# Patient Record
(demographics unavailable — no encounter records)

---

## 2025-01-08 NOTE — ASSESSMENT
[FreeTextEntry1] :   All medical entries were at my, Dr. Henrique Sena, direction. I have reviewed the chart and agree that the record accurately reflects my personal performance of the history, physical exam, assessment, and plan.  Our office nurse practitioner was present for the duration of the office visit.

## 2025-01-08 NOTE — ADDENDUM
[FreeTextEntry1] : Case discussed and reviewed with Dr. Grajeda.  Hemangioma has been noted on prior imaging from [facility].  Pt has been following with him following the Oct 2023 imaging.   [place] for review.

## 2025-01-08 NOTE — CONSULT LETTER
[Dear  ___] : Dear ~CLEMENT, [Consult Letter:] : I had the pleasure of evaluating your patient, [unfilled]. [Please see my note below.] : Please see my note below. [Consult Closing:] : Thank you very much for allowing me to participate in the care of this patient.  If you have any questions, please do not hesitate to contact me. [Sincerely,] : Sincerely, [FreeTextEntry2] : Antelmo Hernandez MD [FreeTextEntry3] : Henrique Sena MD Surgical Oncology [facility] Office: [phone] Cell: [phone]  [DrChava  ___] : Dr. TORREZ [___] : [unfilled]

## 2025-01-08 NOTE — HISTORY OF PRESENT ILLNESS
[de-identified] : Ms. ADRIANA REDDING is a 58-year-old woman, referred by Dr. Antelmo Hernandez for consultation regarding a liver lesion, here for a follow-up visit.   Adriana reports a palpable abdominal mass present for ~4 years, starting in , it has not changed in size, denies any pain or tenderness. She also notes a soft tissue mass to her LUE that she believes is also unchanged. In 2024, she presented to Dr. Antelmo Hernandez with both of these issues, he in turn sent her for a CT A/P that again showed 2 liver masses previously seen 10/2023.  CT A/P (@ [facility]) 10/2023  - liver is enlarged with the right hepatic lobe measuring 19.8 cm in craniocaudal dimension, heterogenous attenuation of the liver - in the inferior right hepatic lobe there is a lobular, partly calcified, hypo enhancing lesion measuring 7 cm, incompletely characterized on this single-phase exam -> f/u w/ liver protocol MRI   LUE U/S 2024 (@ [facility]) - 5.5 x 1.5 cm echogenic subdermal subq mass at the site of concern in left posterosuperior shoulder, nonspecific on U/S and could reflect a lipoma -> further eval w/ contrast MRI recommended   CT A/P 2024 (@ [facility]) - redemonstration of a heterogenous mass with associated calcifications in the anterior segment of the right lobe inferiorly, measures up to 6.7 x 5.8 cm and appears slightly smaller than previous study - there is a stable subcapsular subtle hypoattenuating mass in the anterior segment of the right lobe of the liver measuring 9 mm - no new liver masses  - rectus diastasis w/ a small periumbilical fat-containing hernia   PMH: DM, HTN, HLD, ?hypothyroid  PSH:  B/L knee replacement (2016 & 2019) Meds:  metformin, amlodipine, atorvastatin and Lantus  ALL:  lisinopril (swelling), oxycodone (swelling) SH:  denies ETOH or tobacco use, lives w/ her mom & daughter, not currently working FH: denies any family hx of malignancy  GYN: Menarche 12. Menopause 48. . Age of first full-term pregnancy 28. Denies OCP/HRT  EGD & colonoscopy- 2023 and WNL ECOG 0  liver protocol CT A/P 2024 (@ [facility]) - a 5.9 cm right hepatic lobe heterogenous centrally hypoattenuating lesion demonstrating patchy peripheral enhancement in the delayed phase w/ curvilinear calcifications in the center - there is mild capsular nodularity of the right anterior hepatic lobe secondary to the mass - right hepatic lobe predominant segmental hyperenhancement, which demonstrates isoenhancement in the delayed phase c/w differential perfusion  * Imaging characteristics suggestive of sclerosing hemangioma, however, recommend further eval w/ an MRI abdomen in 3-6 months*  2024 - Adriana presents for an initial consultation after being referred by Dr. Hernandez. She denies any abdominal pain, does not the palpable abdominal mass but it is painless/nontender. Her appetite and weight are well maintained, she does note occasional constipation, but it is a longstanding issue. She is otherwise in her usual state of health.  We reviewed the liver protocol CT scan with radiology.  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  Adriana MRI and will undergo the follow-up with us in 1 month.  MR/MRCP 2024 - mild hepatomegaly - heterogenous exophytic segment 5 calcified liver mass, indeterminate, but slightly decreased from 10/2023 and could represent an unusual presentation of a benign mass such as sclerosed hemangioma, similar numerous coalescent nodules extend superiorly to the liver dome  2024 - Adriana returns for ongoing follow-up and to discuss recent imaging, she denies any new health issues since our last visit. Adriana will follow-up in 6 months after a repeat MRI.

## 2025-01-09 NOTE — ADDENDUM
[FreeTextEntry1] : Case discussed and reviewed with Dr. Grajeda.  Hemangioma has been noted on prior imaging from [facility].  Pt has been following with him following the Oct 2023 imaging.   [place]imaging for review.

## 2025-01-09 NOTE — CONSULT LETTER
[FreeTextEntry2] : Antelmo Hernandez MD [FreeTextEntry3] : Henrique Sena MD Surgical Oncology [facility] Office: [phone] Cell: [phone]

## 2025-01-09 NOTE — HISTORY OF PRESENT ILLNESS
[de-identified] : Ms. ADRIANA REDDING is a 58-year-old woman, referred by Dr. Antelmo Hernandez for consultation regarding a liver lesion, here for a follow-up visit.   Adriana reports a palpable abdominal mass present for ~4 years, starting in , it has not changed in size, denies any pain or tenderness. She also notes a soft tissue mass to her LUE that she believes is also unchanged. In 2024, she presented to Dr. Antelmo Hernandez with both of these issues, he in turn sent her for a CT A/P that again showed 2 liver masses previously seen 10/2023.  CT A/P (@ [facility]) 10/2023  - liver is enlarged with the right hepatic lobe measuring 19.8 cm in craniocaudal dimension, heterogenous attenuation of the liver - in the inferior right hepatic lobe there is a lobular, partly calcified, hypo enhancing lesion measuring 7 cm, incompletely characterized on this single-phase exam -> f/u w/ liver protocol MRI   LUE U/S 2024 (@ [facility]) - 5.5 x 1.5 cm echogenic subdermal subq mass at the site of concern in left posterosuperior shoulder, nonspecific on U/S and could reflect a lipoma -> further eval w/ contrast MRI recommended   CT A/P 2024 (@ [facility]) - redemonstration of a heterogenous mass with associated calcifications in the anterior segment of the right lobe inferiorly, measures up to 6.7 x 5.8 cm and appears slightly smaller than previous study - there is a stable subcapsular subtle hypoattenuating mass in the anterior segment of the right lobe of the liver measuring 9 mm - no new liver masses  - rectus diastasis w/ a small periumbilical fat-containing hernia   PMH: DM, HTN, HLD, ?hypothyroid  PSH:  B/L knee replacement (2016 & 2019) Meds:  metformin, amlodipine, atorvastatin and Lantus  ALL:  lisinopril (swelling), oxycodone (swelling) SH:  denies ETOH or tobacco use, lives w/ her mom & daughter, not currently working FH: denies any family hx of malignancy  GYN: Menarche 12. Menopause 48. . Age of first full-term pregnancy 28. Denies OCP/HRT  EGD & colonoscopy- 2023 and WNL ECOG 0  liver protocol CT A/P 2024 (@ [facility]) - a 5.9 cm right hepatic lobe heterogenous centrally hypoattenuating lesion demonstrating patchy peripheral enhancement in the delayed phase w/ curvilinear calcifications in the center - there is mild capsular nodularity of the right anterior hepatic lobe secondary to the mass - right hepatic lobe predominant segmental hyperenhancement, which demonstrates isoenhancement in the delayed phase c/w differential perfusion  * Imaging characteristics suggestive of sclerosing hemangioma, however, recommend further eval w/ an MRI abdomen in 3-6 months*  2024 - Adriana presents for an initial consultation after being referred by Dr. Hernandez. She denies any abdominal pain, does not the palpable abdominal mass but it is painless/nontender. Her appetite and weight are well maintained, she does note occasional constipation, but it is a longstanding issue. She is otherwise in her usual state of health.  We reviewed the liver protocol CT scan with radiology.  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  Adriana MRI and will undergo the follow-up with us in 1 month.  MR/MRCP 2024 - mild hepatomegaly - heterogenous exophytic segment 5 calcified liver mass, indeterminate, but slightly decreased from 10/2023 and could represent an unusual presentation of a benign mass such as sclerosed hemangioma, similar numerous coalescent nodules extend superiorly to the liver dome  2024 - Adriana returns for ongoing follow-up and to discuss recent imaging, she denies any new health issues since our last visit. Adriana will follow-up in 6 months after a repeat MRI.

## 2025-03-06 NOTE — ADDENDUM
[FreeTextEntry1] : Case discussed and reviewed with Dr. Grajeda.  Hemangioma has been noted on prior imaging from [facility].  Pt has been following with him following the Oct 2023 imaging.   [place] reports[place]imaging for review.

## 2025-03-06 NOTE — HISTORY OF PRESENT ILLNESS
[de-identified] : Ms. ADRIANA REDDING is a 58-year-old woman, referred by Dr. Antelmo Hernandez for consultation regarding a liver lesion, here for a follow-up visit.   Adriana reports a palpable abdominal mass present for ~4 years, starting in , it has not changed in size, denies any pain or tenderness. She also notes a soft tissue mass to her LUE that she believes is also unchanged. In 2024, she presented to Dr. Antelmo Hernandez with both of these issues, he in turn sent her for a CT A/P that again showed 2 liver masses previously seen 10/2023.  CT A/P (@ [facility]) 10/2023  - liver is enlarged with the right hepatic lobe measuring 19.8 cm in craniocaudal dimension, heterogenous attenuation of the liver - in the inferior right hepatic lobe there is a lobular, partly calcified, hypo enhancing lesion measuring 7 cm, incompletely characterized on this single-phase exam -> f/u w/ liver protocol MRI   LUE U/S 2024 (@ [facility]) - 5.5 x 1.5 cm echogenic subdermal subq mass at the site of concern in left posterosuperior shoulder, nonspecific on U/S and could reflect a lipoma -> further eval w/ contrast MRI recommended   CT A/P 2024 (@ [facility]) - redemonstration of a heterogenous mass with associated calcifications in the anterior segment of the right lobe inferiorly, measures up to 6.7 x 5.8 cm and appears slightly smaller than previous study - there is a stable subcapsular subtle hypoattenuating mass in the anterior segment of the right lobe of the liver measuring 9 mm - no new liver masses  - rectus diastasis w/ a small periumbilical fat-containing hernia   PMH: DM, HTN, HLD, ?hypothyroid  PSH:  B/L knee replacement (2016 & 2019) Meds:  metformin, amlodipine, atorvastatin and Lantus  ALL:  lisinopril (swelling), oxycodone (swelling) SH:  denies ETOH or tobacco use, lives w/ her mom & daughter, not currently working FH: denies any family hx of malignancy  GYN: Menarche 12. Menopause 48. . Age of first full-term pregnancy 28. Denies OCP/HRT  EGD & colonoscopy- 2023 and WNL ECOG 0  liver protocol CT A/P 2024 (@ [facility]) - a 5.9 cm right hepatic lobe heterogenous centrally hypoattenuating lesion demonstrating patchy peripheral enhancement in the delayed phase w/ curvilinear calcifications in the center - there is mild capsular nodularity of the right anterior hepatic lobe secondary to the mass - right hepatic lobe predominant segmental hyperenhancement, which demonstrates isoenhancement in the delayed phase c/w differential perfusion  * Imaging characteristics suggestive of sclerosing hemangioma, however, recommend further eval w/ an MRI abdomen in 3-6 months*  2024 - Adriana presents for an initial consultation after being referred by Dr. Hernandez. She denies any abdominal pain, does note the palpable abdominal mass but it is painless/nontender. Her appetite and weight are well maintained, she does note occasional constipation, but it is a longstanding issue. She is otherwise in her usual state of health.  We reviewed the liver protocol CT scan with radiology.  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  Adriana MRI and will undergo the follow-up with us in 1 month.  MR/MRCP 2024 - mild hepatomegaly - heterogenous exophytic segment 5 calcified liver mass, indeterminate, but slightly decreased from 10/2023 and could represent an unusual presentation of a benign mass such as sclerosed hemangioma, similar numerous coalescent nodules extend superiorly to the liver dome  2024 - Adriana returns for ongoing follow-up and to discuss recent imaging, she denies any new health issues since our last visit. Adriana will follow-up in 6 months after a repeat MRI.   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 - Indeterminate 1.3 cm left hepatic lobe lesion is stable from 2024. -  Mildly enlarged bilateral axillary lymph nodes are partially imaged. Nonspecific bone marrow heterogeneity is increased from prior exam -> Consider workup for lymphoproliferative disorder  3/6/2025 - Rosalina returns for ongoing follow-up, she denies any new or worrisome health issues. She feels well overall; appetite & weight are maintained. She denies any B symptoms.

## 2025-03-06 NOTE — HISTORY OF PRESENT ILLNESS
[de-identified] : Ms. ADRIANA REDDING is a 58-year-old woman, referred by Dr. Antelmo Hernandez for consultation regarding a liver lesion, here for a follow-up visit.   Adriana reports a palpable abdominal mass present for ~4 years, starting in , it has not changed in size, denies any pain or tenderness. She also notes a soft tissue mass to her LUE that she believes is also unchanged. In 2024, she presented to Dr. Antelmo Hernandez with both of these issues, he in turn sent her for a CT A/P that again showed 2 liver masses previously seen 10/2023.  CT A/P (@ [facility]) 10/2023  - liver is enlarged with the right hepatic lobe measuring 19.8 cm in craniocaudal dimension, heterogenous attenuation of the liver - in the inferior right hepatic lobe there is a lobular, partly calcified, hypo enhancing lesion measuring 7 cm, incompletely characterized on this single-phase exam -> f/u w/ liver protocol MRI   LUE U/S 2024 (@ [facility]) - 5.5 x 1.5 cm echogenic subdermal subq mass at the site of concern in left posterosuperior shoulder, nonspecific on U/S and could reflect a lipoma -> further eval w/ contrast MRI recommended   CT A/P 2024 (@ [facility]) - redemonstration of a heterogenous mass with associated calcifications in the anterior segment of the right lobe inferiorly, measures up to 6.7 x 5.8 cm and appears slightly smaller than previous study - there is a stable subcapsular subtle hypoattenuating mass in the anterior segment of the right lobe of the liver measuring 9 mm - no new liver masses  - rectus diastasis w/ a small periumbilical fat-containing hernia   PMH: DM, HTN, HLD, ?hypothyroid  PSH:  B/L knee replacement (2016 & 2019) Meds:  metformin, amlodipine, atorvastatin and Lantus  ALL:  lisinopril (swelling), oxycodone (swelling) SH:  denies ETOH or tobacco use, lives w/ her mom & daughter, not currently working FH: denies any family hx of malignancy  GYN: Menarche 12. Menopause 48. . Age of first full-term pregnancy 28. Denies OCP/HRT  EGD & colonoscopy- 2023 and WNL ECOG 0  liver protocol CT A/P 2024 (@ [facility]) - a 5.9 cm right hepatic lobe heterogenous centrally hypoattenuating lesion demonstrating patchy peripheral enhancement in the delayed phase w/ curvilinear calcifications in the center - there is mild capsular nodularity of the right anterior hepatic lobe secondary to the mass - right hepatic lobe predominant segmental hyperenhancement, which demonstrates isoenhancement in the delayed phase c/w differential perfusion  * Imaging characteristics suggestive of sclerosing hemangioma, however, recommend further eval w/ an MRI abdomen in 3-6 months*  2024 - Adriana presents for an initial consultation after being referred by Dr. Hernandez. She denies any abdominal pain, does note the palpable abdominal mass but it is painless/nontender. Her appetite and weight are well maintained, she does note occasional constipation, but it is a longstanding issue. She is otherwise in her usual state of health.  We reviewed the liver protocol CT scan with radiology.  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  Adriana MRI and will undergo the follow-up with us in 1 month.  MR/MRCP 2024 - mild hepatomegaly - heterogenous exophytic segment 5 calcified liver mass, indeterminate, but slightly decreased from 10/2023 and could represent an unusual presentation of a benign mass such as sclerosed hemangioma, similar numerous coalescent nodules extend superiorly to the liver dome  2024 - Adriana returns for ongoing follow-up and to discuss recent imaging, she denies any new health issues since our last visit. Adriana will follow-up in 6 months after a repeat MRI.   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 - Indeterminate 1.3 cm left hepatic lobe lesion is stable from 2024. -  Mildly enlarged bilateral axillary lymph nodes are partially imaged. Nonspecific bone marrow heterogeneity is increased from prior exam -> Consider workup for lymphoproliferative disorder  3/6/2025 - Rosalina returns for ongoing follow-up, she denies any new or worrisome health issues. She feels well overall; appetite & weight are maintained. She denies any B symptoms.

## 2025-03-06 NOTE — ASSESSMENT
[FreeTextEntry1] : We discussed Nati's recent imaging.  She will undergo a mammogram and sonogram and PET/CT in the near future.  She will follow-up with us in approximate 1 month.  All medical entries were at my, Dr. Henrique Sena, direction. I have reviewed the chart and agree that the record accurately reflects my personal performance of the history, physical exam, assessment, and plan.  Our office nurse practitioner was present for the duration of the office visit.

## 2025-03-06 NOTE — ADDENDUM
[FreeTextEntry1] : Case discussed and reviewed with Dr. Grajeda.  Hemangioma has been noted on prior imaging from [facility].  Pt has been following with him following the Oct 2023 imaging.   [place] reports[place]imaging for review.

## 2025-03-06 NOTE — CONSULT LETTER
[FreeTextEntry2] : Antelmo Hernandez MD [FreeTextEntry3] : Henrique Sena MD Surgical Oncology [facility] Office: [phone] Cell: [phone]

## 2025-03-06 NOTE — CONSULT LETTER
[FreeTextEntry2] : Antelmo Hernandez MD [FreeTextEntry3] : Henrique Sena MD Surgical Oncology [facility] Office: [phone] Cell: [phone]

## 2025-04-30 NOTE — ADDENDUM
[FreeTextEntry1] : Case discussed and reviewed with Dr. Grajeda.  Hemangioma has been noted on prior imaging from [facility].  Pt has been following with him following the Oct 2023 imaging.   [place] reports[place]imaging for review.

## 2025-04-30 NOTE — HISTORY OF PRESENT ILLNESS
[de-identified] : Ms. ADRIANA REDDING is a 59-year-old woman, referred by Dr. Antelmo Hernandez for consultation regarding a liver lesion, here for a follow-up visit.   Adriana reports a palpable abdominal mass present for ~4 years, starting in , it has not changed in size, denies any pain or tenderness. She also notes a soft tissue mass to her LUE that she believes is also unchanged. In 2024, she presented to Dr. Antelmo Hernandez with both of these issues, he in turn sent her for a CT A/P that again showed 2 liver masses previously seen 10/2023.  CT A/P (@ [facility]) 10/2023  - liver is enlarged with the right hepatic lobe measuring 19.8 cm in craniocaudal dimension, heterogenous attenuation of the liver - in the inferior right hepatic lobe there is a lobular, partly calcified, hypo enhancing lesion measuring 7 cm, incompletely characterized on this single-phase exam -> f/u w/ liver protocol MRI   LUE U/S 2024 (@ [facility]) - 5.5 x 1.5 cm echogenic subdermal subq mass at the site of concern in left posterosuperior shoulder, nonspecific on U/S and could reflect a lipoma -> further eval w/ contrast MRI recommended   CT A/P 2024 (@ [facility]) - redemonstration of a heterogenous mass with associated calcifications in the anterior segment of the right lobe inferiorly, measures up to 6.7 x 5.8 cm and appears slightly smaller than previous study - there is a stable subcapsular subtle hypoattenuating mass in the anterior segment of the right lobe of the liver measuring 9 mm - no new liver masses  - rectus diastasis w/ a small periumbilical fat-containing hernia   PMH: DM, HTN, HLD, ?hypothyroid  PSH:  B/L knee replacement (2016 & 2019) Meds:  metformin, amlodipine, atorvastatin and Lantus  ALL:  lisinopril (swelling), oxycodone (swelling) SH:  denies ETOH or tobacco use, lives w/ her mom & daughter, not currently working FH: denies any family hx of malignancy  GYN: Menarche 12. Menopause 48. . Age of first full-term pregnancy 28. Denies OCP/HRT  EGD & colonoscopy- 2023 and WNL ECOG 0  liver protocol CT A/P 2024 (@ [facility]) - a 5.9 cm right hepatic lobe heterogenous centrally hypoattenuating lesion demonstrating patchy peripheral enhancement in the delayed phase w/ curvilinear calcifications in the center - there is mild capsular nodularity of the right anterior hepatic lobe secondary to the mass - right hepatic lobe predominant segmental hyperenhancement, which demonstrates isoenhancement in the delayed phase c/w differential perfusion  * Imaging characteristics suggestive of sclerosing hemangioma, however, recommend further eval w/ an MRI abdomen in 3-6 months*  2024 - Adriana presents for an initial consultation after being referred by Dr. Hernandez. She denies any abdominal pain, does note the palpable abdominal mass but it is painless/nontender. Her appetite and weight are well maintained, she does note occasional constipation, but it is a longstanding issue. She is otherwise in her usual state of health.  We reviewed the liver protocol CT scan with radiology.  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  Adriana MRI and will undergo the follow-up with us in 1 month.  MR/MRCP 2024 - mild hepatomegaly - heterogenous exophytic segment 5 calcified liver mass, indeterminate, but slightly decreased from 10/2023 and could represent an unusual presentation of a benign mass such as sclerosed hemangioma, similar numerous coalescent nodules extend superiorly to the liver dome  2024 - Adriana returns for ongoing follow-up and to discuss recent imaging, she denies any new health issues since our last visit. Adriana will follow-up in 6 months after a repeat MRI.   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 - Indeterminate 1.3 cm left hepatic lobe lesion is stable from 2024. -  Mildly enlarged bilateral axillary lymph nodes are partially imaged. Nonspecific bone marrow heterogeneity is increased from prior exam -> Consider workup for lymphoproliferative disorder  3/6/2025 - Rosalina returns for ongoing follow-up, she denies any new or worrisome health issues. She feels well overall; appetite & weight are maintained. She denies any B symptoms. We discussed Adriana's recent imaging.  She will undergo a mammogram and sonogram and PET/CT in the near future.  She will follow-up with us in approximate 1 month.  PET/CT 3/2025 - hypermetabolic prominent to enlarged LN both above & below the diaphragm, the most intensely FDG avid include B/L upper cervical stations, B/L axillary nodes & B/L inguinal nodes. Additional hypermetabolic nodes involve the B/L pelvic debo station -> differentials include lymphoma & lymphoproliferative disorder - intense hypermetabolism is visualized within the B/L parotid glands, which may be secondary benign or malignant etiology such as Liam gland tumor or pleomorphic adenoma -> consider f/u US & US bx - normal sized spleen however w/ mildly increased diffuse hypermetabolism relative to liver activtiy - noonavid fluid density ovoid structure within the posterior RIGHT breast may be r/t cyst - however correlate w/ mammo/sono - no hypermetabolism to a calcified inferior right hepatic lobe mass described on MRI from 2025 - enlarged left thyroid gland containing inferior coarsely calcified low-density structure suggestive for nodule - correlate w/ thyroid US - intense hypermetabolism within the cecum, most of the uptake is associated w/ artifact from pooling of oral contrast - however this may limit sensitivity for any potential small lesions, clinical factors will determine the need for Cscope as warranted

## 2025-04-30 NOTE — CONSULT LETTER
[Dear  ___] : Dear ~CLEMENT, [Consult Letter:] : I had the pleasure of evaluating your patient, [unfilled]. [Please see my note below.] : Please see my note below. [Consult Closing:] : Thank you very much for allowing me to participate in the care of this patient.  If you have any questions, please do not hesitate to contact me. [Sincerely,] : Sincerely, [FreeTextEntry2] : Antelmo Hernandez MD [FreeTextEntry3] : Henrique Sena MD Surgical Oncology [facility] Office: [phone] Cell: [phone]  [DrChava  ___] : Dr. TORREZ [___] : [unfilled]

## 2025-04-30 NOTE — PHYSICAL EXAM
[Normal] : normal breast inspection and palpation of axillas [Normal Axillary Lymph Nodes] : normal axillary lymph nodes [Normal] : oriented to person, place and time, with appropriate affect

## 2025-05-01 NOTE — CONSULT LETTER
[FreeTextEntry2] : Antelmo Hernandez MD [FreeTextEntry3] : Henrique Sena MD Surgical Oncology [facility] Office: [phone] Cell: [phone]

## 2025-05-01 NOTE — ADDENDUM
[FreeTextEntry1] : Case discussed and reviewed with Dr. Grajeda.  Hemangioma has been noted on prior imaging from [facility].  Pt has been following with him following the Oct 2023 imaging.   [place] reports[place]imaging for review.

## 2025-05-29 NOTE — HISTORY OF PRESENT ILLNESS
[de-identified] : Ms. ADRIANA REDDING is a 59-year-old woman, referred by Dr. Antelmo Hernandez for consultation regarding a liver lesion, here for a follow-up visit.   Adriana reports a palpable abdominal mass present for ~4 years, starting in , it has not changed in size, denies any pain or tenderness. She also notes a soft tissue mass to her LUE that she believes is also unchanged. In 2024, she presented to Dr. Antelmo Hernandez with both of these issues, he in turn sent her for a CT A/P that again showed 2 liver masses previously seen 10/2023.  CT A/P (@ [facility]) 10/2023  - liver is enlarged with the right hepatic lobe measuring 19.8 cm in craniocaudal dimension, heterogenous attenuation of the liver - in the inferior right hepatic lobe there is a lobular, partly calcified, hypo enhancing lesion measuring 7 cm, incompletely characterized on this single-phase exam -> f/u w/ liver protocol MRI   LUE U/S 2024 (@ [facility]) - 5.5 x 1.5 cm echogenic subdermal subq mass at the site of concern in left posterosuperior shoulder, nonspecific on U/S and could reflect a lipoma -> further eval w/ contrast MRI recommended   CT A/P 2024 (@ [facility]) - redemonstration of a heterogenous mass with associated calcifications in the anterior segment of the right lobe inferiorly, measures up to 6.7 x 5.8 cm and appears slightly smaller than previous study - there is a stable subcapsular subtle hypoattenuating mass in the anterior segment of the right lobe of the liver measuring 9 mm - no new liver masses  - rectus diastasis w/ a small periumbilical fat-containing hernia   PMH: DM, HTN, HLD, ?hypothyroid  PSH:  B/L knee replacement (2016 & 2019) Meds:  metformin, amlodipine, atorvastatin and Lantus  ALL:  lisinopril (swelling), oxycodone (swelling) SH:  denies ETOH or tobacco use, lives w/ her mom & daughter, not currently working FH: denies any family hx of malignancy  GYN: Menarche 12. Menopause 48. . Age of first full-term pregnancy 28. Denies OCP/HRT  EGD & colonoscopy- 2023 and WNL (Dr. Jerry Grajeda) ECOG 0  liver protocol CT A/P 2024 (@ [facility]) - a 5.9 cm right hepatic lobe heterogenous centrally hypoattenuating lesion demonstrating patchy peripheral enhancement in the delayed phase w/ curvilinear calcifications in the center - there is mild capsular nodularity of the right anterior hepatic lobe secondary to the mass - right hepatic lobe predominant segmental hyperenhancement, which demonstrates isoenhancement in the delayed phase c/w differential perfusion  * Imaging characteristics suggestive of sclerosing hemangioma, however, recommend further eval w/ an MRI abdomen in 3-6 months*  2024 - Adriana presents for an initial consultation after being referred by Dr. Hernandez. She denies any abdominal pain, does note the palpable abdominal mass but it is painless/nontender. Her appetite and weight are well maintained, she does note occasional constipation, but it is a longstanding issue. She is otherwise in her usual state of health.  We reviewed the liver protocol CT scan with radiology.  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  Adriana MRI and will undergo the follow-up with us in 1 month.  MR/MRCP 2024 - mild hepatomegaly - heterogenous exophytic segment 5 calcified liver mass, indeterminate, but slightly decreased from 10/2023 and could represent an unusual presentation of a benign mass such as sclerosed hemangioma, similar numerous coalescent nodules extend superiorly to the liver dome  2024 - Adriana returns for ongoing follow-up and to discuss recent imaging, she denies any new health issues since our last visit. Adriana will follow-up in 6 months after a repeat MRI.   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 - Indeterminate 1.3 cm left hepatic lobe lesion is stable from 2024. -  Mildly enlarged bilateral axillary lymph nodes are partially imaged. Nonspecific bone marrow heterogeneity is increased from prior exam -> Consider workup for lymphoproliferative disorder  3/6/2025 - Rosalina returns for ongoing follow-up, she denies any new or worrisome health issues. She feels well overall; appetite & weight are maintained. She denies any B symptoms. We discussed Adriana's recent imaging.  She will undergo a mammogram and sonogram and PET/CT in the near future.  She will follow-up with us in approximate 1 month.  PET/CT 3/2025 - hypermetabolic prominent to enlarged LN both above & below the diaphragm, the most intensely FDG avid include B/L upper cervical stations, B/L axillary nodes & B/L inguinal nodes. Additional hypermetabolic nodes involve the B/L pelvic debo station -> differentials include lymphoma & lymphoproliferative disorder - intense hypermetabolism is visualized within the B/L parotid glands, which may be secondary benign or malignant etiology such as [place] gland tumor or pleomorphic adenoma -> consider f/u US & US bx - normal sized spleen however w/ mildly increased diffuse hypermetabolism relative to liver activtiy - noonavid fluid density ovoid structure within the posterior RIGHT breast may be r/t cyst - however correlate w/ mammo/sono - no hypermetabolism to a calcified inferior right hepatic lobe mass described on MRI from 2025 - enlarged left thyroid gland containing inferior coarsely calcified low-density structure suggestive for nodule - correlate w/ thyroid US - intense hypermetabolism within the cecum, most of the uptake is associated w/ artifact from pooling of oral contrast - however this may limit sensitivity for any potential small lesions, clinical factors will determine the need for Cscope as warranted  B/L mammo/sono 2025 - multiple B/L abnormal appearing LN in both axilla on mammo/sono -> f/u US biopsy of Left axilla (measuring 6.5 mm) - superficial circumscribed ovoid mildly hypoechoic nodule to R 4:00 N14, 2.1 cm, not dermal (likely corresponding to fluid density seen on PET/CT) -> f/u R US biopsy  - small nodule to R UOP (w/o sono correlate) -> f/u w/ R mammo in 6 months BI-RADS 4B  US biopsies 2025 - 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 She denies any B symptoms, feeling well overall, appetite & weight are well maintained, no fevers/chills or night sweats. She has some residual tenderness from the recent breast & axilla biopsies that are resolving.

## 2025-05-29 NOTE — ADDENDUM
[FreeTextEntry1] : Case discussed and reviewed with Dr. Grajeda.  Hemangioma has been noted on prior imaging from [facility].  Pt has been following with him following the Oct 2023 imaging.   Will obtain [facility] reports/imaging for review.

## 2025-05-29 NOTE — HISTORY OF PRESENT ILLNESS
[de-identified] : Ms. ADRIANA REDDING is a 59-year-old woman, referred by Dr. Antelmo Hernandez for consultation regarding a liver lesion, here for a follow-up visit.   Adriana reports a palpable abdominal mass present for ~4 years, starting in , it has not changed in size, denies any pain or tenderness. She also notes a soft tissue mass to her LUE that she believes is also unchanged. In 2024, she presented to Dr. Antelmo Hernandez with both of these issues, he in turn sent her for a CT A/P that again showed 2 liver masses previously seen 10/2023.  CT A/P (@ [facility]) 10/2023  - liver is enlarged with the right hepatic lobe measuring 19.8 cm in craniocaudal dimension, heterogenous attenuation of the liver - in the inferior right hepatic lobe there is a lobular, partly calcified, hypo enhancing lesion measuring 7 cm, incompletely characterized on this single-phase exam -> f/u w/ liver protocol MRI   LUE U/S 2024 (@ [facility]) - 5.5 x 1.5 cm echogenic subdermal subq mass at the site of concern in left posterosuperior shoulder, nonspecific on U/S and could reflect a lipoma -> further eval w/ contrast MRI recommended   CT A/P 2024 (@ [facility]) - redemonstration of a heterogenous mass with associated calcifications in the anterior segment of the right lobe inferiorly, measures up to 6.7 x 5.8 cm and appears slightly smaller than previous study - there is a stable subcapsular subtle hypoattenuating mass in the anterior segment of the right lobe of the liver measuring 9 mm - no new liver masses  - rectus diastasis w/ a small periumbilical fat-containing hernia   PMH: DM, HTN, HLD, ?hypothyroid  PSH:  B/L knee replacement (2016 & 2019) Meds:  metformin, amlodipine, atorvastatin and Lantus  ALL:  lisinopril (swelling), oxycodone (swelling) SH:  denies ETOH or tobacco use, lives w/ her mom & daughter, not currently working FH: denies any family hx of malignancy  GYN: Menarche 12. Menopause 48. . Age of first full-term pregnancy 28. Denies OCP/HRT  EGD & colonoscopy- 2023 and WNL (Dr. Jerry Grajeda) ECOG 0  liver protocol CT A/P 2024 (@ [facility]) - a 5.9 cm right hepatic lobe heterogenous centrally hypoattenuating lesion demonstrating patchy peripheral enhancement in the delayed phase w/ curvilinear calcifications in the center - there is mild capsular nodularity of the right anterior hepatic lobe secondary to the mass - right hepatic lobe predominant segmental hyperenhancement, which demonstrates isoenhancement in the delayed phase c/w differential perfusion  * Imaging characteristics suggestive of sclerosing hemangioma, however, recommend further eval w/ an MRI abdomen in 3-6 months*  2024 - Adriana presents for an initial consultation after being referred by Dr. Hernandez. She denies any abdominal pain, does note the palpable abdominal mass but it is painless/nontender. Her appetite and weight are well maintained, she does note occasional constipation, but it is a longstanding issue. She is otherwise in her usual state of health.  We reviewed the liver protocol CT scan with radiology.  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  Adriana MRI and will undergo the follow-up with us in 1 month.  MR/MRCP 2024 - mild hepatomegaly - heterogenous exophytic segment 5 calcified liver mass, indeterminate, but slightly decreased from 10/2023 and could represent an unusual presentation of a benign mass such as sclerosed hemangioma, similar numerous coalescent nodules extend superiorly to the liver dome  2024 - Adriana returns for ongoing follow-up and to discuss recent imaging, she denies any new health issues since our last visit. Adriana will follow-up in 6 months after a repeat MRI.   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 - Indeterminate 1.3 cm left hepatic lobe lesion is stable from 2024. -  Mildly enlarged bilateral axillary lymph nodes are partially imaged. Nonspecific bone marrow heterogeneity is increased from prior exam -> Consider workup for lymphoproliferative disorder  3/6/2025 - Rosalina returns for ongoing follow-up, she denies any new or worrisome health issues. She feels well overall; appetite & weight are maintained. She denies any B symptoms. We discussed Adriana's recent imaging.  She will undergo a mammogram and sonogram and PET/CT in the near future.  She will follow-up with us in approximate 1 month.  PET/CT 3/2025 - hypermetabolic prominent to enlarged LN both above & below the diaphragm, the most intensely FDG avid include B/L upper cervical stations, B/L axillary nodes & B/L inguinal nodes. Additional hypermetabolic nodes involve the B/L pelvic debo station -> differentials include lymphoma & lymphoproliferative disorder - intense hypermetabolism is visualized within the B/L parotid glands, which may be secondary benign or malignant etiology such as [place] gland tumor or pleomorphic adenoma -> consider f/u US & US bx - normal sized spleen however w/ mildly increased diffuse hypermetabolism relative to liver activtiy - noonavid fluid density ovoid structure within the posterior RIGHT breast may be r/t cyst - however correlate w/ mammo/sono - no hypermetabolism to a calcified inferior right hepatic lobe mass described on MRI from 2025 - enlarged left thyroid gland containing inferior coarsely calcified low-density structure suggestive for nodule - correlate w/ thyroid US - intense hypermetabolism within the cecum, most of the uptake is associated w/ artifact from pooling of oral contrast - however this may limit sensitivity for any potential small lesions, clinical factors will determine the need for Cscope as warranted  B/L mammo/sono 2025 - multiple B/L abnormal appearing LN in both axilla on mammo/sono -> f/u US biopsy of Left axilla (measuring 6.5 mm) - superficial circumscribed ovoid mildly hypoechoic nodule to R 4:00 N14, 2.1 cm, not dermal (likely corresponding to fluid density seen on PET/CT) -> f/u R US biopsy  - small nodule to R UOP (w/o sono correlate) -> f/u w/ R mammo in 6 months BI-RADS 4B  US biopsies 2025 - 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 She denies any B symptoms, feeling well overall, appetite & weight are well maintained, no fevers/chills or night sweats. She has some residual tenderness from the recent breast & axilla biopsies that are resolving.

## 2025-05-29 NOTE — CONSULT LETTER
[FreeTextEntry2] : Antelmo Hernandez MD [FreeTextEntry3] : Henrique Sena MD Surgical Oncology [facility] Office: [phone] Cell: [phone]

## 2025-05-29 NOTE — ASSESSMENT
[FreeTextEntry1] :  We reviewed Nati's recent imaging and biopsy results and provided her with copies of the reports.  She will undergo a neck ultrasound and biopsy of the parotid and cervical LNs.  She will continue to follow with her Endocrinologist (Dr. Ashlee Brown) for the thyroid nodules.  She will also touch base with her GI (Dr. Jerry Grajeda) regarding her most recent PET CT findings.    She will follow up next month.  All medical entries were at my, Dr. Henrique Sena, direction. I have reviewed the chart and agree that the record accurately reflects my personal performance of the history, physical exam, assessment, and plan.  Our office nurse practitioner was present for the duration of the office visit.

## 2025-05-29 NOTE — ADDENDUM
[FreeTextEntry1] : Case discussed and reviewed with Dr. Grajeda.  Hemangioma has been noted on prior imaging from [facility].  Pt has been following with him following the Oct 2023 imaging.   Will obtain [facility] reports/imaging for review.

## 2025-05-29 NOTE — CONSULT LETTER
[FreeTextEntry2] : Antelmo Hernandez MD [FreeTextEntry3] : Henrique Sena MD Surgical Oncology [facility] Office: [phone] Cell: [phone]

## 2025-06-26 NOTE — HISTORY OF PRESENT ILLNESS
[de-identified] : Ms. ADRIANA ERDDING is a 59-year-old woman, referred by Dr. Antelmo Hernandez for consultation regarding a liver lesion, here for a follow-up visit.   Adriana reports a palpable abdominal mass present for ~4 years, starting in , it has not changed in size, denies any pain or tenderness. She also notes a soft tissue mass to her LUE that she believes is also unchanged. In 2024, she presented to Dr. Antelmo Hernandez with both of these issues, he in turn sent her for a CT A/P that again showed 2 liver masses previously seen 10/2023.  CT A/P (@ [facility]) 10/2023  - liver is enlarged with the right hepatic lobe measuring 19.8 cm in craniocaudal dimension, heterogenous attenuation of the liver - in the inferior right hepatic lobe there is a lobular, partly calcified, hypo enhancing lesion measuring 7 cm, incompletely characterized on this single-phase exam -> f/u w/ liver protocol MRI   LUE U/S 2024 (@ [facility]) - 5.5 x 1.5 cm echogenic subdermal subq mass at the site of concern in left posterosuperior shoulder, nonspecific on U/S and could reflect a lipoma -> further eval w/ contrast MRI recommended   CT A/P 2024 (@ [facility]) - redemonstration of a heterogenous mass with associated calcifications in the anterior segment of the right lobe inferiorly, measures up to 6.7 x 5.8 cm and appears slightly smaller than previous study - there is a stable subcapsular subtle hypoattenuating mass in the anterior segment of the right lobe of the liver measuring 9 mm - no new liver masses  - rectus diastasis w/ a small periumbilical fat-containing hernia   PMH: DM, HTN, HLD, ?hypothyroid  PSH:  B/L knee replacement (2016 & 2019) Meds:  metformin, amlodipine, atorvastatin and Lantus  ALL:  lisinopril (swelling), oxycodone (swelling) SH:  denies ETOH or tobacco use, lives w/ her mom & daughter, not currently working FH: denies any family hx of malignancy  GYN: Menarche 12. Menopause 48. . Age of first full-term pregnancy 28. Denies OCP/HRT  EGD & colonoscopy- 2023 and WNL (Dr. Jerry Grajeda) ECOG 0  liver protocol CT A/P 2024 (@ [facility]) - a 5.9 cm right hepatic lobe heterogenous centrally hypoattenuating lesion demonstrating patchy peripheral enhancement in the delayed phase w/ curvilinear calcifications in the center - there is mild capsular nodularity of the right anterior hepatic lobe secondary to the mass - right hepatic lobe predominant segmental hyperenhancement, which demonstrates isoenhancement in the delayed phase c/w differential perfusion  * Imaging characteristics suggestive of sclerosing hemangioma, however, recommend further eval w/ an MRI abdomen in 3-6 months*  2024 - Adriana presents for an initial consultation after being referred by Dr. Hernandez. She denies any abdominal pain, does note the palpable abdominal mass but it is painless/nontender. Her appetite and weight are well maintained, she does note occasional constipation, but it is a longstanding issue. She is otherwise in her usual state of health.  We reviewed the liver protocol CT scan with radiology.  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  Adriana MRI and will undergo the follow-up with us in 1 month.  MR/MRCP 2024 - mild hepatomegaly - heterogenous exophytic segment 5 calcified liver mass, indeterminate, but slightly decreased from 10/2023 and could represent an unusual presentation of a benign mass such as sclerosed hemangioma, similar numerous coalescent nodules extend superiorly to the liver dome  2024 - Adriana returns for ongoing follow-up and to discuss recent imaging, she denies any new health issues since our last visit. Adriana will follow-up in 6 months after a repeat MRI.   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 - Indeterminate 1.3 cm left hepatic lobe lesion is stable from 2024. -  Mildly enlarged bilateral axillary lymph nodes are partially imaged. Nonspecific bone marrow heterogeneity is increased from prior exam -> Consider workup for lymphoproliferative disorder  3/6/2025 - Rosalina returns for ongoing follow-up, she denies any new or worrisome health issues. She feels well overall; appetite & weight are maintained. She denies any B symptoms. We discussed Adriana's recent imaging.  She will undergo a mammogram and sonogram and PET/CT in the near future.  She will follow-up with us in approximate 1 month.  PET/CT 3/2025 - hypermetabolic prominent to enlarged LN both above & below the diaphragm, the most intensely FDG avid include B/L upper cervical stations, B/L axillary nodes & B/L inguinal nodes. Additional hypermetabolic nodes involve the B/L pelvic debo station -> differentials include lymphoma & lymphoproliferative disorder - intense hypermetabolism is visualized within the B/L parotid glands, which may be secondary benign or malignant etiology such as S[place] gland tumor or pleomorphic adenoma -> consider f/u US & US bx - normal sized spleen however w/ mildly increased diffuse hypermetabolism relative to liver activtiy - noonavid fluid density ovoid structure within the posterior RIGHT breast may be r/t cyst - however correlate w/ mammo/sono - no hypermetabolism to a calcified inferior right hepatic lobe mass described on MRI from 2025 - enlarged left thyroid gland containing inferior coarsely calcified low-density structure suggestive for nodule - correlate w/ thyroid US - intense hypermetabolism within the cecum, most of the uptake is associated w/ artifact from pooling of oral contrast - however this may limit sensitivity for any potential small lesions, clinical factors will determine the need for Cscope as warranted  B/L mammo/sono 2025 - multiple B/L abnormal appearing LN in both axilla on mammo/sono -> f/u US biopsy of Left axilla (measuring 6.5 mm) - superficial circumscribed ovoid mildly hypoechoic nodule to R 4:00 N14, 2.1 cm, not dermal (likely corresponding to fluid density seen on PET/CT) -> f/u R US biopsy  - small nodule to R UOP (w/o sono correlate) -> f/u w/ R mammo in 6 months BI-RADS 4B  US biopsies 2025 - 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 She denies any B symptoms, feeling well overall, appetite & weight are well maintained, no fevers/chills or night sweats. She has some residual tenderness from the recent breast & axilla biopsies that are resolving.  We reviewed Adriana's recent imaging and biopsy results and provided her with copies of the reports.  She will undergo a neck ultrasound and biopsy of the parotid and cervical LNs.  She will continue to follow with her Endocrinologist (Dr. Ashlee Brown) for the thyroid nodules.  She will also touch base with her GI (Dr. Jerry Grajeda) regarding her most recent PET CT findings.   She will follow up next month.

## 2025-06-26 NOTE — CONSULT LETTER
[Dear  ___] : Dear ~CLEMENT, [Consult Letter:] : I had the pleasure of evaluating your patient, [unfilled]. [Please see my note below.] : Please see my note below. [Consult Closing:] : Thank you very much for allowing me to participate in the care of this patient.  If you have any questions, please do not hesitate to contact me. [Sincerely,] : Sincerely, [FreeTextEntry2] : Antelmo Hernandez MD [FreeTextEntry3] : Henrique Sena MD Surgical Oncology [facility] Office: [phone] Cell: [phone]  [DrChava  ___] : Dr. TORREZ [___] : [unfilled]

## 2025-07-29 NOTE — HISTORY OF PRESENT ILLNESS
[de-identified] : Ms. ADRIANA REDDING is a 59-year-old woman, referred by Dr. Antelmo Hernandez for consultation regarding a liver lesion, here for a follow-up visit.   Adriana reports a palpable abdominal mass present for ~4 years, starting in , it has not changed in size, denies any pain or tenderness. She also notes a soft tissue mass to her LUE that she believes is also unchanged. In 2024, she presented to Dr. Antelmo Hernandez with both of these issues, he in turn sent her for a CT A/P that again showed 2 liver masses previously seen 10/2023.  CT A/P (@ [facility]) 10/2023  - liver is enlarged with the right hepatic lobe measuring 19.8 cm in craniocaudal dimension, heterogenous attenuation of the liver - in the inferior right hepatic lobe there is a lobular, partly calcified, hypo enhancing lesion measuring 7 cm, incompletely characterized on this single-phase exam -> f/u w/ liver protocol MRI   LUE U/S 2024 (@ [facility]) - 5.5 x 1.5 cm echogenic subdermal subq mass at the site of concern in left posterosuperior shoulder, nonspecific on U/S and could reflect a lipoma -> further eval w/ contrast MRI recommended   CT A/P 2024 (@ [facility]) - redemonstration of a heterogenous mass with associated calcifications in the anterior segment of the right lobe inferiorly, measures up to 6.7 x 5.8 cm and appears slightly smaller than previous study - there is a stable subcapsular subtle hypoattenuating mass in the anterior segment of the right lobe of the liver measuring 9 mm - no new liver masses  - rectus diastasis w/ a small periumbilical fat-containing hernia   PMH: DM, HTN, HLD, ?hypothyroid  PSH:  B/L knee replacement (2016 & 2019) Meds:  metformin, amlodipine, atorvastatin and Lantus  ALL:  lisinopril (swelling), oxycodone (swelling) SH:  denies ETOH or tobacco use, lives w/ her mom & daughter, not currently working FH: denies any family hx of malignancy  GYN: Menarche 12. Menopause 48. . Age of first full-term pregnancy 28. Denies OCP/HRT  EGD & colonoscopy- 2023 and WNL (Dr. Jerry Grajeda) ECOG 0  liver protocol CT A/P 2024 (@ [facility]) - a 5.9 cm right hepatic lobe heterogenous centrally hypoattenuating lesion demonstrating patchy peripheral enhancement in the delayed phase w/ curvilinear calcifications in the center - there is mild capsular nodularity of the right anterior hepatic lobe secondary to the mass - right hepatic lobe predominant segmental hyperenhancement, which demonstrates isoenhancement in the delayed phase c/w differential perfusion  * Imaging characteristics suggestive of sclerosing hemangioma, however, recommend further eval w/ an MRI abdomen in 3-6 months*  2024 - Adriana presents for an initial consultation after being referred by Dr. Hernandez. She denies any abdominal pain, does note the palpable abdominal mass but it is painless/nontender. Her appetite and weight are well maintained, she does note occasional constipation, but it is a longstanding issue. She is otherwise in her usual state of health.  We reviewed the liver protocol CT scan with radiology.  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  Adriana MRI and will undergo the follow-up with us in 1 month.  MR/MRCP 2024 - mild hepatomegaly - heterogenous exophytic segment 5 calcified liver mass, indeterminate, but slightly decreased from 10/2023 and could represent an unusual presentation of a benign mass such as sclerosed hemangioma, similar numerous coalescent nodules extend superiorly to the liver dome  2024 - Adriana returns for ongoing follow-up and to discuss recent imaging, she denies any new health issues since our last visit. Adriana will follow-up in 6 months after a repeat MRI.   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 - Indeterminate 1.3 cm left hepatic lobe lesion is stable from 2024. -  Mildly enlarged bilateral axillary lymph nodes are partially imaged. Nonspecific bone marrow heterogeneity is increased from prior exam -> Consider workup for lymphoproliferative disorder  3/6/2025 - Rosalina returns for ongoing follow-up, she denies any new or worrisome health issues. She feels well overall; appetite & weight are maintained. She denies any B symptoms. We discussed Adriana's recent imaging.  She will undergo a mammogram and sonogram and PET/CT in the near future.  She will follow-up with us in approximate 1 month.  PET/CT 3/2025 - hypermetabolic prominent to enlarged LN both above & below the diaphragm, the most intensely FDG avid include B/L upper cervical stations, B/L axillary nodes & B/L inguinal nodes. Additional hypermetabolic nodes involve the B/L pelvic debo station -> differentials include lymphoma & lymphoproliferative disorder - intense hypermetabolism is visualized within the B/L parotid glands, which may be secondary benign or malignant etiology such as [place] gland tumor or pleomorphic adenoma -> consider f/u US & US bx - normal sized spleen however w/ mildly increased diffuse hypermetabolism relative to liver activtiy - noonavid fluid density ovoid structure within the posterior RIGHT breast may be r/t cyst - however correlate w/ mammo/sono - no hypermetabolism to a calcified inferior right hepatic lobe mass described on MRI from 2025 - enlarged left thyroid gland containing inferior coarsely calcified low-density structure suggestive for nodule - correlate w/ thyroid US - intense hypermetabolism within the cecum, most of the uptake is associated w/ artifact from pooling of oral contrast - however this may limit sensitivity for any potential small lesions, clinical factors will determine the need for Cscope as warranted  B/L mammo/sono 2025 - multiple B/L abnormal appearing LN in both axilla on mammo/sono -> f/u US biopsy of Left axilla (measuring 6.5 mm) - superficial circumscribed ovoid mildly hypoechoic nodule to R 4:00 N14, 2.1 cm, not dermal (likely corresponding to fluid density seen on PET/CT) -> f/u R US biopsy  - small nodule to R UOP (w/o sono correlate) -> f/u w/ R mammo in 6 months BI-RADS 4B  US biopsies 2025 - 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 She denies any B symptoms, feeling well overall, appetite & weight are well maintained, no fevers/chills or night sweats. She has some residual tenderness from the recent breast & axilla biopsies that are resolving.  We reviewed Adriana's recent imaging and biopsy results and provided her with copies of the reports.  She will undergo a neck ultrasound and biopsy of the parotid and cervical LNs.  She will continue to follow with her Endocrinologist (Dr. Ashlee Brown) for the thyroid nodules.  She will also touch base with her GI (Dr. Jerry Grajeda) regarding her most recent PET CT findings.   She will follow up next month.  thyroid & parathyroid US 2025 - left lower pole thyroid nodule, predominantly isoechoic nodule w/ central coarse calcs -> TI-RADS 4, bx done, adenomatous nodular goiter, category II - prominent benign-appearing left cervical level 2 LN measuring 2 cm -> Bx done, reactive LN *no parotid lesions seen

## 2025-07-29 NOTE — HISTORY OF PRESENT ILLNESS
[de-identified] : Ms. ADRIANA REDDING is a 59-year-old woman, referred by Dr. Antelmo Hernandez for consultation regarding a liver lesion, here for a follow-up visit.   Adriana reports a palpable abdominal mass present for ~4 years, starting in , it has not changed in size, denies any pain or tenderness. She also notes a soft tissue mass to her LUE that she believes is also unchanged. In 2024, she presented to Dr. Antelmo Hernandez with both of these issues, he in turn sent her for a CT A/P that again showed 2 liver masses previously seen 10/2023.  CT A/P (@ [facility]) 10/2023  - liver is enlarged with the right hepatic lobe measuring 19.8 cm in craniocaudal dimension, heterogenous attenuation of the liver - in the inferior right hepatic lobe there is a lobular, partly calcified, hypo enhancing lesion measuring 7 cm, incompletely characterized on this single-phase exam -> f/u w/ liver protocol MRI   LUE U/S 2024 (@ [facility]) - 5.5 x 1.5 cm echogenic subdermal subq mass at the site of concern in left posterosuperior shoulder, nonspecific on U/S and could reflect a lipoma -> further eval w/ contrast MRI recommended   CT A/P 2024 (@ [facility]) - redemonstration of a heterogenous mass with associated calcifications in the anterior segment of the right lobe inferiorly, measures up to 6.7 x 5.8 cm and appears slightly smaller than previous study - there is a stable subcapsular subtle hypoattenuating mass in the anterior segment of the right lobe of the liver measuring 9 mm - no new liver masses  - rectus diastasis w/ a small periumbilical fat-containing hernia   PMH: DM, HTN, HLD, ?hypothyroid  PSH:  B/L knee replacement (2016 & 2019) Meds:  metformin, amlodipine, atorvastatin and Lantus  ALL:  lisinopril (swelling), oxycodone (swelling) SH:  denies ETOH or tobacco use, lives w/ her mom & daughter, not currently working FH: denies any family hx of malignancy  GYN: Menarche 12. Menopause 48. . Age of first full-term pregnancy 28. Denies OCP/HRT  EGD & colonoscopy- 2023 and WNL (Dr. Jerry Grajeda) ECOG 0  liver protocol CT A/P 2024 (@ [facility]) - a 5.9 cm right hepatic lobe heterogenous centrally hypoattenuating lesion demonstrating patchy peripheral enhancement in the delayed phase w/ curvilinear calcifications in the center - there is mild capsular nodularity of the right anterior hepatic lobe secondary to the mass - right hepatic lobe predominant segmental hyperenhancement, which demonstrates isoenhancement in the delayed phase c/w differential perfusion  * Imaging characteristics suggestive of sclerosing hemangioma, however, recommend further eval w/ an MRI abdomen in 3-6 months*  2024 - Adriana presents for an initial consultation after being referred by Dr. Hernandez. She denies any abdominal pain, does note the palpable abdominal mass but it is painless/nontender. Her appetite and weight are well maintained, she does note occasional constipation, but it is a longstanding issue. She is otherwise in her usual state of health.  We reviewed the liver protocol CT scan with radiology.  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  Adriana MRI and will undergo the follow-up with us in 1 month.  MR/MRCP 2024 - mild hepatomegaly - heterogenous exophytic segment 5 calcified liver mass, indeterminate, but slightly decreased from 10/2023 and could represent an unusual presentation of a benign mass such as sclerosed hemangioma, similar numerous coalescent nodules extend superiorly to the liver dome  2024 - Adriana returns for ongoing follow-up and to discuss recent imaging, she denies any new health issues since our last visit. Adriana will follow-up in 6 months after a repeat MRI.   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 - Indeterminate 1.3 cm left hepatic lobe lesion is stable from 2024. -  Mildly enlarged bilateral axillary lymph nodes are partially imaged. Nonspecific bone marrow heterogeneity is increased from prior exam -> Consider workup for lymphoproliferative disorder  3/6/2025 - Rosalina returns for ongoing follow-up, she denies any new or worrisome health issues. She feels well overall; appetite & weight are maintained. She denies any B symptoms. We discussed Adriana's recent imaging.  She will undergo a mammogram and sonogram and PET/CT in the near future.  She will follow-up with us in approximate 1 month.  PET/CT 3/2025 - hypermetabolic prominent to enlarged LN both above & below the diaphragm, the most intensely FDG avid include B/L upper cervical stations, B/L axillary nodes & B/L inguinal nodes. Additional hypermetabolic nodes involve the B/L pelvic debo station -> differentials include lymphoma & lymphoproliferative disorder - intense hypermetabolism is visualized within the B/L parotid glands, which may be secondary benign or malignant etiology such as [place] gland tumor or pleomorphic adenoma -> consider f/u US & US bx - normal sized spleen however w/ mildly increased diffuse hypermetabolism relative to liver activtiy - noonavid fluid density ovoid structure within the posterior RIGHT breast may be r/t cyst - however correlate w/ mammo/sono - no hypermetabolism to a calcified inferior right hepatic lobe mass described on MRI from 2025 - enlarged left thyroid gland containing inferior coarsely calcified low-density structure suggestive for nodule - correlate w/ thyroid US - intense hypermetabolism within the cecum, most of the uptake is associated w/ artifact from pooling of oral contrast - however this may limit sensitivity for any potential small lesions, clinical factors will determine the need for Cscope as warranted  B/L mammo/sono 2025 - multiple B/L abnormal appearing LN in both axilla on mammo/sono -> f/u US biopsy of Left axilla (measuring 6.5 mm) - superficial circumscribed ovoid mildly hypoechoic nodule to R 4:00 N14, 2.1 cm, not dermal (likely corresponding to fluid density seen on PET/CT) -> f/u R US biopsy  - small nodule to R UOP (w/o sono correlate) -> f/u w/ R mammo in 6 months BI-RADS 4B  US biopsies 2025 - 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 She denies any B symptoms, feeling well overall, appetite & weight are well maintained, no fevers/chills or night sweats. She has some residual tenderness from the recent breast & axilla biopsies that are resolving.  We reviewed Adriana's recent imaging and biopsy results and provided her with copies of the reports.  She will undergo a neck ultrasound and biopsy of the parotid and cervical LNs.  She will continue to follow with her Endocrinologist (Dr. Ashlee Brown) for the thyroid nodules.  She will also touch base with her GI (Dr. Jerry Grajeda) regarding her most recent PET CT findings.   She will follow up next month.  thyroid & parathyroid US 2025 - left lower pole thyroid nodule, predominantly isoechoic nodule w/ central coarse calcs -> TI-RADS 4, bx done, adenomatous nodular goiter, category II - prominent benign-appearing left cervical level 2 LN measuring 2 cm -> Bx done, reactive LN *no parotid lesions seen

## 2025-07-29 NOTE — ADDENDUM
[FreeTextEntry1] : Case discussed and reviewed with Dr. Grajeda.  Hemangioma has been noted on prior imaging from [facility].  Pt has been following with him following the Oct 2023 imaging.   Will obtain [place] reports/imaging for review.

## 2025-07-29 NOTE — ADDENDUM
[FreeTextEntry1] : Case discussed and reviewed with Dr. Grajeda.  Hemangioma has been noted on prior imaging from [facility].  Pt has been following with him following the Oct 2023 imaging.   Will obtain [place] reports/imaging for review.

## 2025-07-29 NOTE — CONSULT LETTER
[FreeTextEntry2] : Antelmo Hernandez MD [FreeTextEntry3] : Henrique Sena MD Surgical Oncology [facility] Office: [phone] Cell: [phone]

## 2025-07-29 NOTE — CONSULT LETTER
[FreeTextEntry2] : Antelmo Hernandez MD [FreeTextEntry3] : Henrique Sena MD Surgical Oncology [facility] Office: [phone] Cell: [phone]